# Patient Record
Sex: FEMALE | Race: WHITE | NOT HISPANIC OR LATINO | Employment: FULL TIME | URBAN - METROPOLITAN AREA
[De-identification: names, ages, dates, MRNs, and addresses within clinical notes are randomized per-mention and may not be internally consistent; named-entity substitution may affect disease eponyms.]

---

## 2022-03-17 ENCOUNTER — HOSPITAL ENCOUNTER (OUTPATIENT)
Dept: RADIOLOGY | Facility: CLINIC | Age: 42
Discharge: HOME | End: 2022-03-17
Attending: NURSE PRACTITIONER
Payer: MEDICAID

## 2022-03-17 ENCOUNTER — OFFICE VISIT (OUTPATIENT)
Dept: URGENT CARE | Facility: CLINIC | Age: 42
End: 2022-03-17
Payer: MEDICAID

## 2022-03-17 VITALS
RESPIRATION RATE: 20 BRPM | DIASTOLIC BLOOD PRESSURE: 68 MMHG | HEIGHT: 65 IN | WEIGHT: 186 LBS | SYSTOLIC BLOOD PRESSURE: 109 MMHG | OXYGEN SATURATION: 99 % | BODY MASS INDEX: 30.99 KG/M2 | HEART RATE: 83 BPM | TEMPERATURE: 98 F

## 2022-03-17 DIAGNOSIS — S09.90XA ACUTE HEAD INJURY WITHOUT LOSS OF CONSCIOUSNESS, INITIAL ENCOUNTER: Primary | ICD-10-CM

## 2022-03-17 DIAGNOSIS — S09.90XA ACUTE HEAD INJURY WITHOUT LOSS OF CONSCIOUSNESS, INITIAL ENCOUNTER: ICD-10-CM

## 2022-03-17 PROCEDURE — 70260 XR SKULL COMPLETE MIN 4 VIEWS: ICD-10-PCS | Mod: S$GLB,,, | Performed by: RADIOLOGY

## 2022-03-17 PROCEDURE — 70260 X-RAY EXAM OF SKULL: CPT | Mod: S$GLB,,, | Performed by: RADIOLOGY

## 2022-03-17 PROCEDURE — 99203 OFFICE O/P NEW LOW 30 MIN: CPT | Mod: S$GLB,,, | Performed by: NURSE PRACTITIONER

## 2022-03-17 PROCEDURE — 99203 PR OFFICE/OUTPT VISIT, NEW, LEVL III, 30-44 MIN: ICD-10-PCS | Mod: S$GLB,,, | Performed by: NURSE PRACTITIONER

## 2022-03-17 RX ORDER — SERTRALINE HYDROCHLORIDE 100 MG/1
2 TABLET, FILM COATED ORAL DAILY
COMMUNITY
Start: 2021-09-20

## 2022-03-18 NOTE — PROGRESS NOTES
"Subjective:       Patient ID: Mary Robertson is a 41 y.o. female.    Vitals:  height is 5' 5" (1.651 m) and weight is 84.4 kg (186 lb). Her temperature is 97.8 °F (36.6 °C). Her blood pressure is 109/68 and her pulse is 83. Her respiration is 20 and oxygen saturation is 99%.     Chief Complaint: Head Injury    Patient presents to clinic with complaint of a head injury. States she was moving a plastic bin with a bin inside and it fell and hit her on the right Frontal bone at hairline.  States she had no LOC .  States some dizziness and headache  This event happened at around 3:30 this afternoon.  Patient states she feels like "stars are going around my head still".  No blurred vision. Patient states she does not want an injury like Ramez Saget    Head Injury   The incident occurred 3 to 6 hours ago. The injury mechanism was a direct blow. There was no loss of consciousness. There was no blood loss. The quality of the pain is described as throbbing and sharp. The pain is at a severity of 4/10. The pain is moderate. The pain has been constant since the injury. Associated symptoms include disorientation, headaches and memory loss. Pertinent negatives include no blurred vision, numbness, tinnitus, vomiting or weakness. She has tried nothing for the symptoms. The treatment provided no relief.       Constitution: Negative for chills, sweating, fatigue, fever and generalized weakness.   HENT: Negative for ear pain, ear discharge, tinnitus, hearing loss, nosebleeds and postnasal drip.    Neck: Negative for neck pain and neck stiffness.   Cardiovascular: Negative for chest pain and palpitations.   Eyes: Negative for vision loss, double vision and blurred vision.   Respiratory: Negative for chest tightness, cough, shortness of breath and wheezing.    Gastrointestinal: Negative for abdominal pain, nausea, vomiting and diarrhea.   Musculoskeletal: Negative for muscle ache.   Neurological: Positive for dizziness, headaches and " disorientation. Negative for light-headedness, speech difficulty, coordination disturbances, loss of balance, altered mental status, loss of consciousness and numbness.        Patient reports initial dizziness and feeling disoriented when the incident occurred however denies loss of consciousness,  nausea, memory loss, slurred speech or visual disturbance.  Reports dizziness and  Headache post injury   Psychiatric/Behavioral: Positive for disorientation. Negative for altered mental status.       Objective:      Physical Exam   Constitutional: She is oriented to person, place, and time. She appears well-developed. She is cooperative.  Non-toxic appearance. She does not appear ill. No distress.      Comments:Patient ambulates without assistance, does not appear dizzy and walks to and from the exam room in NAD   HENT:   Head: Normocephalic and atraumatic.       Ears:   Right Ear: Hearing, tympanic membrane, external ear and ear canal normal. No drainage. Tympanic membrane is not injected, not erythematous and not bulging. No middle ear effusion. No hemotympanum.   Left Ear: Hearing, tympanic membrane, external ear and ear canal normal. No drainage. Tympanic membrane is not injected, not erythematous and not bulging.  No middle ear effusion. No hemotympanum.   Nose: Nose normal. No mucosal edema, rhinorrhea, purulent discharge or nasal deformity. No epistaxis. Right sinus exhibits no maxillary sinus tenderness and no frontal sinus tenderness. Left sinus exhibits no maxillary sinus tenderness and no frontal sinus tenderness.   Mouth/Throat: Uvula is midline, oropharynx is clear and moist and mucous membranes are normal. Mucous membranes are moist. No trismus in the jaw. Normal dentition. No uvula swelling. No oropharyngeal exudate or posterior oropharyngeal erythema. Oropharynx is clear.   Eyes: Conjunctivae and lids are normal. Pupils are equal, round, and reactive to light. Right eye exhibits no discharge. Left eye  exhibits no discharge. No scleral icterus.      extraocular movement intact   Neck: Trachea normal and phonation normal. Neck supple.   Cardiovascular: Normal rate, regular rhythm, S1 normal, S2 normal, normal heart sounds and normal pulses. Exam reveals no decreased pulses.   Pulmonary/Chest: Effort normal and breath sounds normal. No accessory muscle usage. No respiratory distress. She has no decreased breath sounds. She has no wheezes. She has no rhonchi. She has no rales.   Abdominal: Normal appearance and bowel sounds are normal. She exhibits no distension and no mass. Soft. There is no abdominal tenderness.   Musculoskeletal: Normal range of motion.         General: No deformity. Normal range of motion.   Lymphadenopathy:     She has no cervical adenopathy.   Neurological: no focal deficit. She is alert and oriented to person, place, and time. She has normal motor skills, normal sensation and intact cranial nerves. She displays no atrophy. She exhibits normal muscle tone. Gait and coordination normal. Coordination normal.   Skin: Skin is warm, dry, intact, not diaphoretic and not pale.   Psychiatric: Her speech is normal and behavior is normal. Judgment and thought content normal.   Nursing note and vitals reviewed.        Assessment:       1. Acute head injury without loss of consciousness, initial encounter          Plan:       Discussed with patient that the xray final result was pending but that we would give her a call with final results.  In the meantime, patient given strict ER precautions for any worsening of symptoms and/or new symptoms. Patient then said she did not want to experience anything like Ramez Giang.  I advised patient that I could not speak to Ramez Giang. diagnosis or tragedy but recommended if she was truly concerned about an internal head injury, a CT would be recommended but we did not have this type of imaging onsite.     Patient then stated she did not come to the clinic wanting a CT  Scan but just for us to tell her that she was okay.  Again reiterated that a CT Scan is gold standard test to rule out any internal injury. Also recommended that she follow with Internal Medicine in the morning whch she agreed.      I again reviewed ER precautions with patient that were also listed within the discharge instructions.  She verbalized understanding and agreed with the plan of care.  Patient denied any further questions or concerns and exited the exam room in NAD.  Acute head injury without loss of consciousness, initial encounter  -     XR SKULL COMPLETE MIN 4 VIEWS; Future; Expected date: 03/17/2022  -     Ambulatory referral/consult to Internal Medicine      Patient Instructions     General Referral to [facility]   You were referred to [facility] for follow-up care on your condition.    Please call [phone] to schedule your appointment.    Please go to the Emergency Department for any concerns or worsening of condition.  Please follow up with your primary care doctor or specialist in the next 48-72hrs as needed.    If you  smoke, please stop smoking.

## 2022-03-18 NOTE — PATIENT INSTRUCTIONS
General Referral to [facility]   You were referred to [facility] for follow-up care on your condition.    Please call [phone] to schedule your appointment.    Please go to the Emergency Department for any concerns or worsening of condition.  Please follow up with your primary care doctor or specialist in the next 48-72hrs as needed.    If you  smoke, please stop smoking.

## 2022-03-20 ENCOUNTER — TELEPHONE (OUTPATIENT)
Dept: URGENT CARE | Facility: CLINIC | Age: 42
End: 2022-03-20
Payer: COMMERCIAL

## 2022-03-23 ENCOUNTER — TELEPHONE (OUTPATIENT)
Dept: URGENT CARE | Facility: CLINIC | Age: 42
End: 2022-03-23
Payer: COMMERCIAL

## 2022-03-23 NOTE — TELEPHONE ENCOUNTER
Discussed x-ray results with the patient.  Patient states that she still has intermittent headaches but overall doing well.  She was advised to monitor for signs of concussion and follow up with PCP if symptoms persist.

## 2022-03-23 NOTE — TELEPHONE ENCOUNTER
Pt called requesting her xray (head) results from 3/17/2022. Pt states that she's having an issue logging into her My chart. MA, tried to help pt log in, but was unsuccessful.  Pt wanting verbal report. Pt advise msg will be sent to a provider & someone will call her back, Pt v/u

## 2023-05-02 ENCOUNTER — TELEPHONE (OUTPATIENT)
Dept: ORTHOPEDICS | Facility: CLINIC | Age: 43
End: 2023-05-02
Payer: MEDICAID

## 2023-05-02 ENCOUNTER — HOSPITAL ENCOUNTER (EMERGENCY)
Facility: HOSPITAL | Age: 43
Discharge: HOME | End: 2023-05-02
Attending: EMERGENCY MEDICINE
Payer: MEDICAID

## 2023-05-02 VITALS
TEMPERATURE: 99 F | DIASTOLIC BLOOD PRESSURE: 89 MMHG | RESPIRATION RATE: 20 BRPM | WEIGHT: 204 LBS | BODY MASS INDEX: 33.99 KG/M2 | HEART RATE: 109 BPM | HEIGHT: 65 IN | OXYGEN SATURATION: 97 % | SYSTOLIC BLOOD PRESSURE: 145 MMHG

## 2023-05-02 DIAGNOSIS — S52.124A CLOSED NONDISPLACED FRACTURE OF HEAD OF RIGHT RADIUS, INITIAL ENCOUNTER: Primary | ICD-10-CM

## 2023-05-02 PROCEDURE — 99281 EMR DPT VST MAYX REQ PHY/QHP: CPT | Mod: 25

## 2023-05-02 PROCEDURE — 99283 EMERGENCY DEPT VISIT LOW MDM: CPT | Mod: ,,, | Performed by: PHYSICIAN ASSISTANT

## 2023-05-02 PROCEDURE — 99283 PR EMERGENCY DEPT VISIT,LEVEL III: ICD-10-PCS | Mod: ,,, | Performed by: PHYSICIAN ASSISTANT

## 2023-05-02 NOTE — TELEPHONE ENCOUNTER
Spoke to patient and emailed her a copy of providers that are accepting medicaid----- Message from Romina Castrejon sent at 5/2/2023 10:38 AM CDT -----  Name of Who is Calling:JIM CHERY [[id number]]              What is the request in detail:Requesting a call back to get a cast for her right arm              Can the clinic reply by [facility]:no              What Number to Call Back if not in [facility]:[phone]

## 2023-05-02 NOTE — ED NOTES
Patient identifiers verified and correct for  Ms Robertson  C/C:  Needs cast Right arm SEE NN  APPEARANCE: awake and alert in NAD. PAIN  2/10  SKIN: warm, dry and intact. No breakdown or bruising.  MUSCULOSKELETAL: Patient moving all extremities spontaneously, no obvious swelling or deformities noted. Ambulates independently.  RESPIRATORY: Denies shortness of breath.Respirations unlabored.   CARDIAC: Denies CP, 2+ distal pulses; no peripheral edema  ABDOMEN: S/ND/NT, Denies nausea  : voids spontaneously, denies difficulty  Neurologic: AAO x 4; follows commands equal strength in all extremities; denies numbness/tingling. Denies dizziness  elyse new eakness, arrives with Orthoglass splint to right arm

## 2023-05-02 NOTE — TELEPHONE ENCOUNTER
Attempted to contact patient in regards to an appointment. Left a voicemail, apologizing that Dr. Younger's medicaid panel is completely full, and he can reach out to the Medicaid Hotline at [phone].

## 2023-05-02 NOTE — ED PROVIDER NOTES
Encounter Date: 5/2/2023       History     Chief Complaint   Patient presents with    Arm Injury     Broke right arm yesterday; fall. UC had XR. Has disc.      42-year-old female presents emergency department for radial head fracture.  Fell yesterday at work and was seen at urgent care and diagnosed with a radial head fracture.  Attempted to contact Orthopedics today but patient not take Medicaid.  Denies any new complaints.    The history is provided by the patient.   Review of patient's allergies indicates:   Allergen Reactions    Minocycline Rash     Past Medical History:   Diagnosis Date    Headache(784.0)     Pinched nerve in shoulder      Past Surgical History:   Procedure Laterality Date    MOUTH SURGERY      TONSILLECTOMY       Family History   Problem Relation Age of Onset    Cancer Father         brain    Diabetes Maternal Grandmother     Heart disease Maternal Grandmother      Social History     Tobacco Use    Smoking status: Some Days     Packs/day: 0.25     Years: 14.00     Pack years: 3.50     Types: Cigarettes    Smokeless tobacco: Current   Substance Use Topics    Alcohol use: Yes     Comment: rarely    Drug use: No     Review of Systems   Constitutional:  Negative for chills and fever.   HENT:  Negative for sore throat.    Respiratory:  Negative for cough and shortness of breath.    Cardiovascular:  Negative for chest pain.   Gastrointestinal:  Negative for abdominal pain.   Genitourinary:  Negative for difficulty urinating.   Skin: Negative.    Neurological:  Negative for weakness.   Psychiatric/Behavioral:  Negative for confusion.      Physical Exam     Initial Vitals [05/02/23 1403]   BP Pulse Resp Temp SpO2   (!) 145/89 109 20 98.8 °F (37.1 °C) 97 %      MAP       --         Physical Exam    Nursing note and vitals reviewed.  Constitutional: She appears well-developed and well-nourished.   HENT:   Head: Normocephalic and atraumatic.   Eyes: Conjunctivae are normal. Pupils are equal, round, and  reactive to light.   Neck: Neck supple.   Normal range of motion.  Cardiovascular:  Regular rhythm, normal heart sounds and intact distal pulses.           Pulmonary/Chest: Breath sounds normal.   Abdominal: Abdomen is soft. There is no abdominal tenderness.   Musculoskeletal:      Cervical back: Normal range of motion and neck supple.      Comments: Patient presents with a posterior long-arm splint     Neurological: She is alert and oriented to person, place, and time. GCS score is 15. GCS eye subscore is 4. GCS verbal subscore is 5. GCS motor subscore is 6.   Skin: Skin is warm and dry. Capillary refill takes less than 2 seconds.   Psychiatric: She has a normal mood and affect.       ED Course   Procedures  Labs Reviewed   HIV 1 / 2 ANTIBODY   HEPATITIS C ANTIBODY          Imaging Results    None          Medications - No data to display  Medical Decision Making:   History:   Old Medical Records: I decided to obtain old medical records.  Initial Assessment:   Patient presents the ED for Orthopedics referral.  Fell yesterday and diagnosed with nondisplaced right radial head fracture.  Unable to make follow-up appointment due to Medicaid insurance.  Differential Diagnosis:   Nondisplaced fracture.  ED Management:  Patient has a splint in place.  2+ radial pulses.  Denies any new pain.  No x-ray in our system currently.  She refuses repeat x-ray today and just wants a referral.  Will place outpatient referral for [facility].  Discussed return to ED precautions.                        Clinical Impression:   Final diagnoses:  [S58.183A] Closed nondisplaced fracture of head of right radius, initial encounter (Primary)        ED Disposition Condition    Discharge Stable          ED Prescriptions    None       Follow-up Information       Follow up With Specialties Details Why Contact [facility] Surgical Oncology, Orthopedic Surgery, Genetics, Physical Medicine and Rehabilitation, Occupational  Therapy, Radiology Schedule an appointment as soon as possible for a visit   [address] [place] [id number]  [phone]               Rambo Cornejo PA-C  05/02/23 4239

## 2023-05-02 NOTE — ED NOTES
"Patient states he broke her arm and needs to get a cast, seen at  yesterday, sent to ED for a " cast"  "

## 2023-05-02 NOTE — TELEPHONE ENCOUNTER
----- Message from Coco Garcia sent at 5/2/2023  9:54 AM CDT -----  Regarding: Call back  Contact: [phone]  Type:  Sooner Apoointment Request    Caller is requesting a sooner appointment.  Caller declined first available appointment listed below.  Caller will not accept being placed on the waitlist and is requesting a message be sent to doctor.  Name of Caller: New Patient   When is the first available appointment? Medicaid was asked to send a message   Symptoms: Right None displaced fracture of radial head   Would the patient rather a call back or a response via [facility]? Call back   Best Call Back Number: [phone]  Additional Information: patient will get a referral to be sent to the clinic.

## 2023-05-02 NOTE — ED NOTES
PA at bedside for dc instructions Discharge home, states understanding to follow up as directed. Ambulates out of ED without difficulty.

## 2023-05-02 NOTE — TELEPHONE ENCOUNTER
Lvm for patient stating Dr Dao is not accepting Medicaid at the moment but to reach out to us to get a list of providers

## 2023-05-03 NOTE — PLAN OF CARE
GREG faxed Ambulatory referral/consult for Orthopedics to [facility] ( [phone])          Mallory De La Cruz LMSW  Case Management  Emergency Department  [phone]

## 2023-05-05 ENCOUNTER — OFFICE VISIT (OUTPATIENT)
Dept: URGENT CARE | Facility: CLINIC | Age: 43
End: 2023-05-05
Payer: MEDICAID

## 2023-05-05 VITALS
TEMPERATURE: 98 F | SYSTOLIC BLOOD PRESSURE: 109 MMHG | RESPIRATION RATE: 14 BRPM | DIASTOLIC BLOOD PRESSURE: 65 MMHG | HEART RATE: 96 BPM | WEIGHT: 203.94 LBS | HEIGHT: 65 IN | OXYGEN SATURATION: 97 % | BODY MASS INDEX: 33.98 KG/M2

## 2023-05-05 DIAGNOSIS — S52.124A CLOSED NONDISPLACED FRACTURE OF HEAD OF RIGHT RADIUS, INITIAL ENCOUNTER: Primary | ICD-10-CM

## 2023-05-05 PROBLEM — F32.A DEPRESSION: Status: ACTIVE | Noted: 2021-09-08

## 2023-05-05 PROCEDURE — 99214 OFFICE O/P EST MOD 30 MIN: CPT | Mod: S$GLB,,, | Performed by: EMERGENCY MEDICINE

## 2023-05-05 PROCEDURE — 99214 PR OFFICE/OUTPT VISIT, EST, LEVL IV, 30-39 MIN: ICD-10-PCS | Mod: S$GLB,,, | Performed by: EMERGENCY MEDICINE

## 2023-05-05 RX ORDER — IBUPROFEN 800 MG/1
800 TABLET ORAL 3 TIMES DAILY
COMMUNITY
Start: 2023-05-02

## 2023-05-05 NOTE — PROGRESS NOTES
"Subjective:      Patient ID: Mary Robertson is a 42 y.o. female.    Vitals:  height is 5' 5" (1.651 m) and weight is 92.5 kg (203 lb 14.8 oz). Her temperature is 97.6 °F (36.4 °C). Her blood pressure is 109/65 and her pulse is 96. Her respiration is 14 and oxygen saturation is 97%.     Chief Complaint: Arm Pain    Pt states she fell while walking on 05/01/23. Pt is feeling minor pain in right arm, she went to an urgent care and was dx with a radial head fracture. Pt was referred to a specialist and is not able to by ortho. Pt is still feeling discomfort especially when moving the injured arm. Pt has taken otc pain relievers with mild relief.    42-year-old female presents today for evaluation of arm pain.  She fell on the 1st of the month and sustained a nondisplaced radial head fracture in the right upper extremity.  Was diagnosed with this in an urgent care.  Patient subsequently had 2 ER visits on May 2nd.  At 1 of them she was placed in a splint and was given an ortho referral.  When she called to make the ortho referral they told her she could not get in for 6 months.  Patient is not currently established with a primary care provider.  She took her splint off several days ago because it was hot.    Arm Pain   The incident occurred 3 to 5 days ago. The incident occurred at work. The injury mechanism was a fall. The pain is present in the right forearm. The quality of the pain is described as aching. The pain does not radiate. The pain is at a severity of 4/10. The pain is mild. The pain has been Constant since the incident. She has tried NSAIDs and acetaminophen for the symptoms. The treatment provided no relief.     Constitution: Negative for fatigue and fever.   Musculoskeletal:  Positive for pain and trauma.    Objective:     Physical Exam   Constitutional: She is oriented to person, place, and time. No distress.   Cardiovascular: Normal rate, regular rhythm and normal pulses.   Abdominal: Normal appearance. "   Musculoskeletal:      Comments: Soft tissue swelling noted anterolaterally in the right elbow and proximal forearm.  There is some tenderness over the radial head.  Patient has decent range of motion at this time and is neurovascularly intact   Neurological: She is alert and oriented to person, place, and time.   Skin: Skin is warm and dry.   Nursing note and vitals reviewed.    Assessment:     1. Closed nondisplaced fracture of head of right radius, initial encounter        Plan:       Closed nondisplaced fracture of head of right radius, initial encounter          Medical Decision Making:   History:   Old Records Summarized: records from another hospital.       <> Summary of Records: Reviewed ER visits on the 2nd.  Initial Assessment:   Patient with known radial head fracture presenting without a splint for evaluation.  Differential Diagnosis:   Fracture versus contusion  Urgent Care Management:  Patient advised that we do not currently have x-ray so I can not a re-evaluate with that modality.  Patient is advised that she needs to wear the splint to prevent any new injury causing this nondisplaced fracture to become displaced.  At the time, she does not have a surgical injury.  She strongly encouraged to not smoke and to keep her splint in place.  We talked about possibility that in 3 or 4 weeks everything maybe healed if she follows these recommendations.  I have encouraged her to establish with a primary care to at least get follow-up with an x-ray in that time to make sure everything is healing.  Patient ran back to her house to get her splint so that I can show her how to put it on appropriately.    Patient returned to clinic and apparently had been placed in a long-arm posterior splint.  This is not appropriate for radial head fracture.  We placed OCL splint, sugar-tong.  Neurovascularly intact afterward.

## 2023-05-06 NOTE — PATIENT INSTRUCTIONS
Keep splint in place for up to 3-4 weeks.  Follow-up with the PCP in the next couple of weeks to get the area re-x-rayed and followed up.    Go to the emergency room for any worsening

## 2023-05-08 ENCOUNTER — TELEPHONE (OUTPATIENT)
Dept: URGENT CARE | Facility: CLINIC | Age: 43
End: 2023-05-08
Payer: MEDICAID

## 2023-05-08 NOTE — TELEPHONE ENCOUNTER
Courtesy call was made. Patient did not answer. A voicemail was left directing patient to call back if she had any questions or concerns.